# Patient Record
Sex: MALE | Race: WHITE | NOT HISPANIC OR LATINO | URBAN - METROPOLITAN AREA
[De-identification: names, ages, dates, MRNs, and addresses within clinical notes are randomized per-mention and may not be internally consistent; named-entity substitution may affect disease eponyms.]

---

## 2017-08-26 ENCOUNTER — OFFICE VISIT - HEALTHEAST (OUTPATIENT)
Dept: FAMILY MEDICINE | Facility: CLINIC | Age: 37
End: 2017-08-26

## 2017-08-26 DIAGNOSIS — J45.901 ASTHMA EXACERBATION: ICD-10-CM

## 2017-08-26 DIAGNOSIS — R06.02 SHORTNESS OF BREATH: ICD-10-CM

## 2017-08-26 RX ORDER — ALBUTEROL SULFATE 0.83 MG/ML
2.5 SOLUTION RESPIRATORY (INHALATION) 3 TIMES DAILY
Status: SHIPPED | COMMUNITY
Start: 2017-08-26

## 2017-08-31 ENCOUNTER — COMMUNICATION - HEALTHEAST (OUTPATIENT)
Dept: PULMONOLOGY | Facility: OTHER | Age: 37
End: 2017-08-31

## 2017-08-31 ENCOUNTER — RECORDS - HEALTHEAST (OUTPATIENT)
Dept: ADMINISTRATIVE | Facility: OTHER | Age: 37
End: 2017-08-31

## 2021-05-30 ENCOUNTER — RECORDS - HEALTHEAST (OUTPATIENT)
Dept: ADMINISTRATIVE | Facility: CLINIC | Age: 41
End: 2021-05-30

## 2021-05-31 ENCOUNTER — RECORDS - HEALTHEAST (OUTPATIENT)
Dept: ADMINISTRATIVE | Facility: CLINIC | Age: 41
End: 2021-05-31

## 2021-05-31 VITALS — WEIGHT: 203 LBS

## 2021-06-12 NOTE — PROGRESS NOTES
"ASSESSMENT:   1. Asthma exacerbation  predniSONE (DELTASONE) 20 MG tablet   2. Shortness of breath  XR Chest PA and Lateral        PLAN:  Prednisone 40mg once daily x 5 days.  Albuterol nebulizer or Ventolin inhaler every 4-6 hours as needed.  Use spacer with inhaler.  Continue Advair.     Asthma is poorly controlled - he is using his rescue inhaler at minimum once a day, even when he \"feels well.\"Recommend  f/u with primary care provider in 2-3 days to recheck and discuss further treatment of asthma, sooner in the Emergency Room if any new or worsening symptoms.     SUBJECTIVE:   Pj Laureano is a 37 y.o. male presents today with 2 days complaint of cough. Cough is nonproductive. He also complains of shortness of breath and chest feels tight. Sick contacts: none.He has asthma - is on daily Advair and has a Ventolin inhaler and albuterol nebulizer at home.  He states he uses his Ventolin inhaler at least once daily, even when he \"feels well\".  Will often require his rescue inhaler if he runs short distances at work  Nonsmoker.  For the past several days, has had to increase using his albuterol to q3-4 hours without relief.  Last used 4 hours ago. He was last on oral steroids 2-3 years ago. No previous hospitalizations for asthma.  He was hospitalized for a \"collapsed lung\" at age 16, but he states he does not believe this was related to his asthma.     Denies fever, chills, rhinorrhea, nasal congestion, sore throat, body aches.    There is no problem list on file for this patient.      History   Smoking Status     Never Smoker   Smokeless Tobacco     Never Used       Current Medications:  No current outpatient prescriptions on file prior to visit.     No current facility-administered medications on file prior to visit.        Allergies:   No Known Allergies    OBJECTIVE:   Vitals:    08/26/17 1228   BP: 124/64   Patient Site: Right Arm   Patient Position: Sitting   Cuff Size: Adult Regular   Pulse: 90   Temp: " 98  F (36.7  C)   TempSrc: Oral   SpO2: 96%   Weight: 203 lb (92.1 kg)     Physical exam reveals a pleasant 37 y.o. male.   Appears healthy, alert, cooperative and in NAD.  Eyes:  No conjunctivitis, lids normal.   Ears:  normal TMs bilaterally  Nose:    Mucosa normal.   Mouth:  Mucosa pink and moist.  no pharyngitis, no exudate and uvula is midline.    Lymph: no cervical LAD  Lungs: normal respiratory effort. Speaks in clear, full sentences.  Lungs with diffuse expiratory wheezes with poor air entry at lower lung fields.  No rales or rhonchi.  Heart: regular rate and rhythm, no murmur, rub or gallop  Skin: pink, warm, dry, and without lesions on limited skin exam.     Pt given albuterol nebulizer in clinic.  Reassessment after nebulizer given reveals improvement in lung sounds and air entry, but still having expiratory wheezes at bilateral lung bases.  Postneb pulse ox 95%. Patient reports subjective improvement in symptoms.     I personally did a preliminary review of xray: strand of fibrosis in the RML and lingula.  No acute infiltrate, no pneumothorax (pending final review by radiologist)